# Patient Record
Sex: FEMALE | Race: OTHER | Employment: OTHER | URBAN - METROPOLITAN AREA
[De-identification: names, ages, dates, MRNs, and addresses within clinical notes are randomized per-mention and may not be internally consistent; named-entity substitution may affect disease eponyms.]

---

## 2019-09-13 ENCOUNTER — HOSPITAL ENCOUNTER (OUTPATIENT)
Dept: MAMMOGRAPHY | Age: 49
Discharge: HOME | End: 2019-09-13

## 2019-09-13 ENCOUNTER — OFFICE VISIT (OUTPATIENT)
Dept: FAMILY PLANNING/WOMEN'S HEALTH CLINIC | Age: 49
End: 2019-09-13

## 2019-09-13 VITALS — SYSTOLIC BLOOD PRESSURE: 129 MMHG | DIASTOLIC BLOOD PRESSURE: 85 MMHG

## 2019-09-13 DIAGNOSIS — Z01.419 ENCOUNTER FOR WELL WOMAN EXAM: Primary | ICD-10-CM

## 2019-09-13 DIAGNOSIS — Z12.31 VISIT FOR SCREENING MAMMOGRAM: ICD-10-CM

## 2019-09-13 DIAGNOSIS — N63.0 BREAST MASS IN FEMALE: ICD-10-CM

## 2019-09-13 NOTE — PROGRESS NOTES
Assessment/Plan:    Diagnoses and all orders for this visit:    1. Encounter for well woman exam    2. Breast mass in female    pedro breast mass, right > left. Right breast bx done on mass in 2014 but pt states that the mass has enlarged and changed  Will get dx imaging and ultrasound and f/up as needed         [address] [facility], [place]  Evelio Caldera expressed understanding of this plan. An AVS was printed and given to the patient.      ----------------------------------------------------------------------    Chief Complaint   Patient presents with    Well Woman     EWL visit       History of Present Illness:  45 yo, UTD on pap at local clinic, presents for breast exam  States that she has mass in right breast. Had bx of this mass 5 years ago but states that the mass has enlarged and changed  The bx report in her chart indicates that it was cystic mass  She stopped having periods 1 year ago      No past medical history on file. Current Outpatient Medications   Medication Sig Dispense Refill    metformin HCl (METFORMIN PO) Take  by mouth. No Known Allergies    Social History     Tobacco Use    Smoking status: Never Smoker    Smokeless tobacco: Never Used   Substance Use Topics    Alcohol use: Not on file    Drug use: Not on file       No family history on file. Physical Exam:     Visit Vitals  /85   LMP 09/01/2018 (Approximate)       A&Ox3  WDWN NAD  Respirations normal and non labored  Breast exam- right breast she has mass that takes up much of her upper/outer quadrant and some of her medial/upper quadrant measuring 5 cm by 3 cm and irregular, mobile and not tender. No retractions  Left breast- she has a smaller mass with similar characteristics upper outer, mobile, measuring 3 cm by 2 cm.  No retractions

## 2019-09-13 NOTE — PROGRESS NOTES
EVERY WOMANS LIFE HISTORY QUESTIONNAIRE       No Yes Comments   Has a doctor ever seen or felt anything wrong with your breast? []                                  [x]                                  Right breast, in 2014   Have you ever had a breast biopsy? [x]                                  []                                  But had cyst aspirated 2014 at [facility]        When and where was last mammogram performed? 4/2014     Have you ever been told that there was a problem on your mammogram?   No Yes Comments   []                                  [x]                                  cyst     Do you have breast implants? No Yes Comments   [x]                                  []                                       When was your last Pap test performed? 7/30/2019 at [facility]  Have you ever had an abnormal Pap test?   No Yes Comments   [x]                                  []                                       Have you had a hysterectomy? No Yes Comments (why)   [x]                                  []                                       Have you been through menopause? No Yes Date of LMP   []                                  [x]                                  1 yr ago     Did your mother take YVONNE? No Yes Unknown   [x]                                  []                                       Do you have a history of HIV exposure? No Yes    [x]                                  []                                       Have you ever been diagnosed with any type of Cancer   No Yes Comments (type,when,where,type of treatment   [x]                                  []                                          Has a family member been diagnosed with breast or ovarian cancer?    No Yes Comments (which family members, and type   [x]                                  []                                       Are you taking hormone replacement therapy (HRT)     No Yes Comments   [x] []                                       How many times have you been pregnant?     0      Number of live births ? Are you experiencing any of the following? No Yes Comments   Nipple Discharge [x]                                  []                                     Breast Lump/Masses [x]                                  []                                     Breast Skin Changes [x]                                  []                                          No Yes Comments   Vaginal Discharge [x]                                  []                                     Abnormal/unusual vaginal bleeding [x]                                  []                                         Are you experiencing any other health problems? Diabetes, Goes to [facility].    Age at first period?   13  Age at first birth? n/a    Ht--1.5 m  Wt-- 80 #

## 2019-09-24 ENCOUNTER — HOSPITAL ENCOUNTER (OUTPATIENT)
Dept: MAMMOGRAPHY | Age: 49
Discharge: HOME | End: 2019-09-24

## 2019-09-24 DIAGNOSIS — R92.8 ABNORMAL MAMMOGRAM: ICD-10-CM

## 2019-09-24 PROCEDURE — 76642 ULTRASOUND BREAST LIMITED: CPT

## 2019-09-24 PROCEDURE — 77062 BREAST TOMOSYNTHESIS BI: CPT

## 2019-09-25 ENCOUNTER — TELEPHONE (OUTPATIENT)
Dept: FAMILY PLANNING/WOMEN'S HEALTH CLINIC | Age: 49
End: 2019-09-25

## 2020-03-05 DIAGNOSIS — R92.8 ABNORMAL FINDINGS ON DIAGNOSTIC IMAGING OF BREAST: Primary | ICD-10-CM

## 2020-03-20 ENCOUNTER — HOSPITAL ENCOUNTER (OUTPATIENT)
Dept: MAMMOGRAPHY | Age: 50
Discharge: HOME | End: 2020-03-20

## 2020-03-20 DIAGNOSIS — R92.8 ABNORMAL FINDINGS ON DIAGNOSTIC IMAGING OF BREAST: ICD-10-CM

## 2020-03-20 PROCEDURE — 76642 ULTRASOUND BREAST LIMITED: CPT

## 2020-08-20 ENCOUNTER — HOSPITAL ENCOUNTER (EMERGENCY)
Age: 50
Discharge: HOME | End: 2020-08-20
Attending: EMERGENCY MEDICINE | Admitting: EMERGENCY MEDICINE
Payer: SUBSIDIZED

## 2020-08-20 ENCOUNTER — APPOINTMENT (OUTPATIENT)
Dept: GENERAL RADIOLOGY | Age: 50
End: 2020-08-20
Attending: EMERGENCY MEDICINE
Payer: SUBSIDIZED

## 2020-08-20 VITALS
DIASTOLIC BLOOD PRESSURE: 81 MMHG | RESPIRATION RATE: 16 BRPM | HEART RATE: 70 BPM | SYSTOLIC BLOOD PRESSURE: 154 MMHG | OXYGEN SATURATION: 97 % | TEMPERATURE: 98.3 F

## 2020-08-20 DIAGNOSIS — S43.401A SPRAIN OF RIGHT SHOULDER, UNSPECIFIED SHOULDER SPRAIN TYPE, INITIAL ENCOUNTER: Primary | ICD-10-CM

## 2020-08-20 PROCEDURE — 74011250637 HC RX REV CODE- 250/637: Performed by: EMERGENCY MEDICINE

## 2020-08-20 PROCEDURE — 73080 X-RAY EXAM OF ELBOW: CPT

## 2020-08-20 PROCEDURE — 72050 X-RAY EXAM NECK SPINE 4/5VWS: CPT

## 2020-08-20 PROCEDURE — 73030 X-RAY EXAM OF SHOULDER: CPT

## 2020-08-20 PROCEDURE — 99283 EMERGENCY DEPT VISIT LOW MDM: CPT

## 2020-08-20 RX ORDER — IBUPROFEN 600 MG/1
600 TABLET ORAL
Status: COMPLETED | OUTPATIENT
Start: 2020-08-20 | End: 2020-08-20

## 2020-08-20 RX ORDER — HYDROCODONE BITARTRATE AND ACETAMINOPHEN 5; 325 MG/1; MG/1
1 TABLET ORAL
Status: COMPLETED | OUTPATIENT
Start: 2020-08-20 | End: 2020-08-20

## 2020-08-20 RX ORDER — HYDROCODONE BITARTRATE AND ACETAMINOPHEN 5; 325 MG/1; MG/1
1 TABLET ORAL
Qty: 8 TAB | Refills: 0 | Status: SHIPPED | OUTPATIENT
Start: 2020-08-20 | End: 2020-08-22

## 2020-08-20 RX ADMIN — HYDROCODONE BITARTRATE AND ACETAMINOPHEN 1 TABLET: 5; 325 TABLET ORAL at 18:57

## 2020-08-20 RX ADMIN — IBUPROFEN 600 MG: 600 TABLET, FILM COATED ORAL at 18:57

## 2020-08-20 NOTE — ED TRIAGE NOTES
She slipped on the stairs earlier and caught herself with her right arm. she now lhas pain right upper arm and shoulder.

## 2020-08-20 NOTE — ED PROVIDER NOTES
57-year-old female presents emergency room for evaluation of right shoulder and arm pain. History obtained via  interpretation by the patient's friend per request of the patient. This afternoon at 2:00 patient was walking down the steps when she slipped. Patient fell down 5 steps onto her right arm. Patient states her arm was extended. Patient with pain in the right shoulder and the right elbow. No numbness or tingling or loss of sensation. Patient tried over-the-counter medicine and pain rub with no relief. Pain is described as sharp. Pain is worse with any movement. Patient denies any injury to the head. No loss of consciousness. No abdominal pain, nausea or vomiting. No numbness or tingling of the extremity. No neck or back pain. No chest pain. No hip or pelvic pain. No lower extremity pain. No alleviating factors. Patient denies any dizziness lightheadedness headache chest pain shortness of breath prior to the fall. This was a pure slip mechanical fall    Social hx  Nonsmoker  No alcohol    The history is provided by the patient. Arm Pain    Pertinent negatives include no numbness and no neck pain.         Past Medical History:   Diagnosis Date    Diabetes [facility])        Past Surgical History:   Procedure Laterality Date    HX BREAST BIOPSY Left     13yo- benign per patient    US GUIDE ASP BREAST RT CYST W NDL Right 2014    benign         Family History:   Problem Relation Age of Onset    Breast Cancer Sister 27    Breast Cancer Maternal Aunt        Social History     Socioeconomic History    Marital status: LEGALLY      Spouse name: Not on file    Number of children: Not on file    Years of education: Not on file    Highest education level: Not on file   Occupational History    Not on file   Social Needs    Financial resource strain: Not on file    Food insecurity     Worry: Not on file     Inability: Not on file    Transportation needs     Medical: Not on file Non-medical: Not on file   Tobacco Use    Smoking status: Never Smoker    Smokeless tobacco: Never Used   Substance and Sexual Activity    Alcohol use: Not on file    Drug use: Not on file    Sexual activity: Not on file   Lifestyle    Physical activity     Days per week: Not on file     Minutes per session: Not on file    Stress: Not on file   Relationships    Social connections     Talks on phone: Not on file     Gets together: Not on file     Attends Mu-ism service: Not on file     Active member of club or organization: Not on file     Attends meetings of clubs or organizations: Not on file     Relationship status: Not on file    Intimate partner violence     Fear of current or ex partner: Not on file     Emotionally abused: Not on file     Physically abused: Not on file     Forced sexual activity: Not on file   Other Topics Concern    Not on file   Social History Narrative    Not on file         ALLERGIES: Patient has no known allergies. Review of Systems   Constitutional: Negative for chills and fatigue. HENT: Negative for trouble swallowing. Eyes: Negative for photophobia and visual disturbance. Respiratory: Negative for cough, chest tightness and shortness of breath. Cardiovascular: Negative for chest pain. Gastrointestinal: Negative for abdominal pain, nausea and vomiting. Musculoskeletal: Positive for arthralgias (right shoulder, elbow). Negative for joint swelling, myalgias, neck pain and neck stiffness. Skin: Negative for color change and rash. Neurological: Negative for dizziness, weakness, light-headedness, numbness and headaches. All other systems reviewed and are negative. Vitals:    08/20/20 1805   BP: 154/81   Pulse: 70   Resp: 16   Temp: 98.3 °F (36.8 °C)   SpO2: 97%            Physical Exam  Vitals signs and nursing note reviewed. Constitutional:       General: She is not in acute distress. Appearance: Normal appearance. She is well-developed. HENT:      Head: Normocephalic and atraumatic. Right Ear: External ear normal.      Left Ear: External ear normal.      Nose: Nose normal.   Eyes:      General:         Right eye: No discharge. Left eye: No discharge. Conjunctiva/sclera: Conjunctivae normal.      Pupils: Pupils are equal, round, and reactive to light. Neck:      Musculoskeletal: Normal range of motion and neck supple. Comments: No cervical midline tenderness to palpation of Cspine. Cardiovascular:      Rate and Rhythm: Normal rate and regular rhythm. Heart sounds: Normal heart sounds. Pulmonary:      Effort: Pulmonary effort is normal. No respiratory distress. Breath sounds: Normal breath sounds. Chest:      Chest wall: No tenderness. Abdominal:      General: Bowel sounds are normal. There is no distension. Palpations: Abdomen is soft. Abdomen is not rigid. There is no hepatomegaly, splenomegaly or mass. Tenderness: There is no abdominal tenderness. There is no right CVA tenderness, left CVA tenderness, guarding or rebound. Negative signs include Carrillo's sign and McBurney's sign. Hernia: No hernia is present. Musculoskeletal: Normal range of motion. General: Tenderness present. Comments: Right Shoulder: no redness, warmth, swelling, no edema, no ecchymosis, no obvious deformity. ot tenderness along glenohumeral joint. range of motion limited by pain. Tender along the elbow diffusely. FROM of elbow. Pt able to fully flex and extend. Axillary nerve intact, radial,media,ulnar nerve intact. 5/5  strength, 5/5 ab/addcution of fingers. Cap refill brisk < 3 seconds. Pt neurovascularly intact. Skin:     General: Skin is warm and dry. Findings: No erythema or rash. Neurological:      Mental Status: She is alert and oriented to person, place, and time. Cranial Nerves: No cranial nerve deficit. Motor: No abnormal muscle tone.       Coordination: Coordination normal.      Deep Tendon Reflexes: Reflexes are normal and symmetric. Reflexes normal.   Psychiatric:         Behavior: Behavior normal.         Thought Content: Thought content normal.         Judgment: Judgment normal.          MDM  Number of Diagnoses or Management Options  Diagnosis management comments: 53-year-old female presenting to the emergency room for evaluation right shoulder pain status post fall. no neurological deficits on exam.  No midline tenderness to cervical thoracic or lumbar spine. Tender along the glenohumeral joint and the elbow. Neurovascularly intact  Plan: X-ray and pain medicine      7:46 PM  Xray negative for acute process. Patient is well hydrated, well appearing, and in no respiratory distress. Physical exam is reassuring, and without signs of serious illness. Will discharge patient home with sling, supportive care, and follow-up with orthopedics within the next few days. With Upper sorbian interpretation: Standard narcotic and sedating medication warnings given. Patient's results have been reviewed with them. Patient and/or family have verbally conveyed their understanding and agreement of the patient's signs, symptoms, diagnosis, treatment and prognosis and additionally agree to follow up as recommended or return to the Emergency Room should their condition change prior to follow-up. Discharge instructions have also been provided to the patient with some educational information regarding their diagnosis as well a list of reasons why they would want to return to the ER prior to their follow-up appointment should their condition change. Amount and/or Complexity of Data Reviewed  Discuss the patient with other providers: yes (ER attending-Dorothy)           Procedures    Pt case including HPI, PE, and all available lab and radiology results has been discussed with attending physician. Opportunity to evaluate patient has been provided to ER attending.   Discharge and prescription plan has been agreed upon.

## 2020-08-20 NOTE — DISCHARGE INSTRUCTIONS
Advil or aleve for pain. Norco:1 pill every 6 hours as needed for severe pain. Be aware of sedating effects, no alcohol or driving with this medicine. Keep shoulder elevated for next 48 hours. Place ice in a bag and apply to shoulder  for 20 minutes 4-5 times a day for next 48 hours. Return to ER for any redness, warmth, increased swelling  fever over 101. Advil o aleve para el dolor. Norco: 1 pastilla cada 6 horas según sea necesario para el dolor intenso. Tenga en cuenta los efectos sedantes, no beber alcohol o conducir con [employer]. [place] próximas 48 horas. Coloque hielo en nicky bolsa y aplíquelo en el hombro mita 20 minutos 4-5 veces al día mita las próximas 48 horas. Regrese a la tatum de emergencias si tiene enrojecimiento, calor o aumento de la fiebre por encima de 101. Dolor en el hombro: Instrucciones de cuidado  Shoulder Pain: Care Instructions  Instrucciones de cuidado    Puede lesionar osborne hombro al usarlo demasiado mita [place], rob pescar o jugar béisbol. Puede suceder rob parte del desgaste cotidiano por el envejecimiento. Las lesiones de hombro pueden tardar tiempo en sanar, anisha osborne hombro debería mejorar con [employer]. Osborne médico podría recomendar un cabestrillo para descansar el hombro. Si se lesionó el hombro, adrianne vez necesite pruebas y [place]. La atención de seguimiento es nicky parte clave de osborne tratamiento y seguridad. Asegúrese de hacer y acudir a todas las citas, y llame a osborne médico si está teniendo problemas. También es nicky buena idea saber los resultados de shanon exámenes y mantener nicky lista de los medicamentos que keily. ¿Cómo puede cuidarse en el hogar? · [employer] analgésicos (medicamentos para el dolor) exactamente según las indicaciones. ? Si el médico le recetó analgésicos, tómelos según las indicaciones. ? Si no está tomando un analgésico recetado, pregúntele a osborne médico si puede milan roberto de [address] ?  No tome dos o más analgésicos al [employer], a menos que el médico se lo haya indicado. Muchos analgésicos contienen acetaminofén, es decir, Tylenol. El exceso de acetaminofén (Tylenol) puede ser dañino. · Si osborne médico le recomienda usar un cabestrillo, úselo rob se le haya indicado. No se lo quite antes de que se lo indique el médico.  · Aplíquese hielo o nicky compresa fría sobre la laura adolorida mita 10 a 20 minutos cada vez. Póngase un paño cross entre el hielo y la piel. · Si no hay hinchazón, puede aplicar calor húmedo, nicky almohadilla térmica o un paño tibio sobre el hombro. Algunos médicos sugieren alternar [employer] [place] y aftab. · Descanse el hombro mita algunos días. Si osborne médico lo recomienda, puede comenzar a ejercitar el hombro con suavidad, anisha no levante nada pesado. ¿Cuándo debe pedir ayuda? Llame [id number] en cualquier momento que considere que necesita atención de [place]. Por ejemplo, llame si:  · Siente dolor u opresión en el pecho. [place] podría ocurrir junto con:  ? Sudoración. ? Falta de aire. ? Náuseas o vómito. ? Dolor que se extiende del pecho al darling, la Laura, o hacia roberto o ambos hombros o [id number] ? [employer]. ? Pulso rápido o irregular. Después de llamar al 911, mastique 1 aspirina para adultos. Espere a la ambulancia. No trate de conducir usted mismo un automóvil. · Osborne brazo o mano está frío o pálido, o cambia de color. Llame a osborne médico ahora mismo o busque atención médica inmediata si:  · Tiene señales de infección, tales rob:  ? Mayor dolor, hinchazón, enrojecimiento o aumento de la temperatura en el hombro. ? Vetas rojizas que comienzan en nicky laura del hombro. ? Pus que supura de nicky laura del hombro. ? Ganglios linfáticos inflamados en el darling, las axilas o la jessica. ? Yesica Castro. Preste especial atención a los cambios en osborne carmelo y asegúrese de comunicarse con osborne médico si:  · No puede usar el hombro.   · El hombro no mejora rob se esperaba. ¿Dónde puede encontrar más información en inglés? Yareli Tan a [email or web address]  Cathi Shields [id number] en la búsqueda para aprender más acerca de \"Dolor en el hombro: Instrucciones de cuidado. \"  Revisado: 2 marzo, 2020               Versión del contenido: 12.5  © 0921-4250 Healthwise, [employer] (formerly [employer]). Las instrucciones de cuidado fueron adaptadas bajo licencia por [facility] (which disclaims liability or warranty for this information). Si usted tiene [place] afección médica o sobre estas instrucciones, siempre pregunte a osborne profesional de carmelo. [employer], [employer] niega toda garantía o responsabilidad por osborne uso de esta información.

## 2022-07-26 ENCOUNTER — HOSPITAL ENCOUNTER (EMERGENCY)
Age: 52
Discharge: HOME | End: 2022-07-26
Attending: EMERGENCY MEDICINE

## 2022-07-26 ENCOUNTER — APPOINTMENT (OUTPATIENT)
Dept: CT IMAGING | Age: 52
End: 2022-07-26
Attending: NURSE PRACTITIONER

## 2022-07-26 VITALS
DIASTOLIC BLOOD PRESSURE: 83 MMHG | TEMPERATURE: 97.6 F | RESPIRATION RATE: 20 BRPM | OXYGEN SATURATION: 99 % | SYSTOLIC BLOOD PRESSURE: 155 MMHG | HEART RATE: 68 BPM

## 2022-07-26 DIAGNOSIS — R11.2 NAUSEA AND VOMITING, UNSPECIFIED VOMITING TYPE: ICD-10-CM

## 2022-07-26 DIAGNOSIS — R10.84 ABDOMINAL PAIN, GENERALIZED: Primary | ICD-10-CM

## 2022-07-26 LAB
ALBUMIN SERPL-MCNC: 4.2 G/DL (ref 3.5–5)
ALBUMIN/GLOB SERPL: 1.2 {RATIO} (ref 1.1–2.2)
ALP SERPL-CCNC: 105 U/L (ref 45–117)
ALT SERPL-CCNC: 48 U/L (ref 12–78)
ANION GAP SERPL CALC-SCNC: 9 MMOL/L (ref 5–15)
AST SERPL-CCNC: 22 U/L (ref 15–37)
BASOPHILS # BLD: 0 K/UL (ref 0–0.1)
BASOPHILS NFR BLD: 0 % (ref 0–1)
BILIRUB SERPL-MCNC: 0.5 MG/DL (ref 0.2–1)
BUN SERPL-MCNC: 16 MG/DL (ref 6–20)
BUN/CREAT SERPL: 23 (ref 12–20)
CALCIUM SERPL-MCNC: 9.7 MG/DL (ref 8.5–10.1)
CHLORIDE SERPL-SCNC: 101 MMOL/L (ref 97–108)
CO2 SERPL-SCNC: 28 MMOL/L (ref 21–32)
COMMENT, HOLDF: NORMAL
CREAT SERPL-MCNC: 0.69 MG/DL (ref 0.55–1.02)
DIFFERENTIAL METHOD BLD: NORMAL
EOSINOPHIL # BLD: 0.1 K/UL (ref 0–0.4)
EOSINOPHIL NFR BLD: 1 % (ref 0–7)
ERYTHROCYTE [DISTWIDTH] IN BLOOD BY AUTOMATED COUNT: 11.9 % (ref 11.5–14.5)
GLOBULIN SER CALC-MCNC: 3.4 G/DL (ref 2–4)
GLUCOSE SERPL-MCNC: 144 MG/DL (ref 65–100)
HCT VFR BLD AUTO: 42.3 % (ref 35–47)
HGB BLD-MCNC: 14.5 G/DL (ref 11.5–16)
IMM GRANULOCYTES # BLD AUTO: 0 K/UL (ref 0–0.04)
IMM GRANULOCYTES NFR BLD AUTO: 0 % (ref 0–0.5)
LIPASE SERPL-CCNC: 161 U/L (ref 73–393)
LYMPHOCYTES # BLD: 2.2 K/UL (ref 0.8–3.5)
LYMPHOCYTES NFR BLD: 23 % (ref 12–49)
MAGNESIUM SERPL-MCNC: 2.2 MG/DL (ref 1.6–2.4)
MCH RBC QN AUTO: 29.1 PG (ref 26–34)
MCHC RBC AUTO-ENTMCNC: 34.3 G/DL (ref 30–36.5)
MCV RBC AUTO: 84.9 FL (ref 80–99)
MONOCYTES # BLD: 0.6 K/UL (ref 0–1)
MONOCYTES NFR BLD: 6 % (ref 5–13)
NEUTS SEG # BLD: 6.6 K/UL (ref 1.8–8)
NEUTS SEG NFR BLD: 70 % (ref 32–75)
NRBC # BLD: 0 K/UL (ref 0–0.01)
NRBC BLD-RTO: 0 PER 100 WBC
PLATELET # BLD AUTO: 276 K/UL (ref 150–400)
PMV BLD AUTO: 10.1 FL (ref 8.9–12.9)
POTASSIUM SERPL-SCNC: 3.7 MMOL/L (ref 3.5–5.1)
PROT SERPL-MCNC: 7.6 G/DL (ref 6.4–8.2)
RBC # BLD AUTO: 4.98 M/UL (ref 3.8–5.2)
SAMPLES BEING HELD,HOLD: NORMAL
SODIUM SERPL-SCNC: 138 MMOL/L (ref 136–145)
WBC # BLD AUTO: 9.5 K/UL (ref 3.6–11)

## 2022-07-26 PROCEDURE — 83690 ASSAY OF LIPASE: CPT

## 2022-07-26 PROCEDURE — 74011250636 HC RX REV CODE- 250/636: Performed by: NURSE PRACTITIONER

## 2022-07-26 PROCEDURE — 85025 COMPLETE CBC W/AUTO DIFF WBC: CPT

## 2022-07-26 PROCEDURE — 74011000636 HC RX REV CODE- 636: Performed by: RADIOLOGY

## 2022-07-26 PROCEDURE — 83735 ASSAY OF MAGNESIUM: CPT

## 2022-07-26 PROCEDURE — 99285 EMERGENCY DEPT VISIT HI MDM: CPT

## 2022-07-26 PROCEDURE — 74177 CT ABD & PELVIS W/CONTRAST: CPT

## 2022-07-26 PROCEDURE — 80053 COMPREHEN METABOLIC PANEL: CPT

## 2022-07-26 PROCEDURE — 36415 COLL VENOUS BLD VENIPUNCTURE: CPT

## 2022-07-26 PROCEDURE — 96374 THER/PROPH/DIAG INJ IV PUSH: CPT

## 2022-07-26 PROCEDURE — 96372 THER/PROPH/DIAG INJ SC/IM: CPT

## 2022-07-26 PROCEDURE — 96375 TX/PRO/DX INJ NEW DRUG ADDON: CPT

## 2022-07-26 RX ORDER — KETOROLAC TROMETHAMINE 30 MG/ML
15 INJECTION, SOLUTION INTRAMUSCULAR; INTRAVENOUS
Status: COMPLETED | OUTPATIENT
Start: 2022-07-26 | End: 2022-07-26

## 2022-07-26 RX ORDER — ONDANSETRON 4 MG/1
4 TABLET, FILM COATED ORAL
Qty: 20 TABLET | Refills: 0 | Status: SHIPPED | OUTPATIENT
Start: 2022-07-26

## 2022-07-26 RX ORDER — ONDANSETRON 2 MG/ML
4 INJECTION INTRAMUSCULAR; INTRAVENOUS
Status: COMPLETED | OUTPATIENT
Start: 2022-07-26 | End: 2022-07-26

## 2022-07-26 RX ORDER — DICYCLOMINE HYDROCHLORIDE 10 MG/1
10 CAPSULE ORAL 3 TIMES DAILY
Qty: 20 CAPSULE | Refills: 0 | Status: SHIPPED | OUTPATIENT
Start: 2022-07-26 | End: 2022-08-02

## 2022-07-26 RX ORDER — DICYCLOMINE HYDROCHLORIDE 10 MG/ML
20 INJECTION INTRAMUSCULAR
Status: COMPLETED | OUTPATIENT
Start: 2022-07-26 | End: 2022-07-26

## 2022-07-26 RX ORDER — SODIUM CHLORIDE 9 MG/ML
1000 INJECTION, SOLUTION INTRAVENOUS ONCE
Status: COMPLETED | OUTPATIENT
Start: 2022-07-26 | End: 2022-07-26

## 2022-07-26 RX ADMIN — KETOROLAC TROMETHAMINE 15 MG: 30 INJECTION, SOLUTION INTRAMUSCULAR at 20:56

## 2022-07-26 RX ADMIN — SODIUM CHLORIDE 1000 ML: 9 INJECTION, SOLUTION INTRAVENOUS at 20:57

## 2022-07-26 RX ADMIN — DICYCLOMINE HYDROCHLORIDE 20 MG: 20 INJECTION INTRAMUSCULAR at 22:19

## 2022-07-26 RX ADMIN — ONDANSETRON HYDROCHLORIDE 4 MG: 2 SOLUTION INTRAMUSCULAR; INTRAVENOUS at 20:56

## 2022-07-26 RX ADMIN — IOPAMIDOL 100 ML: 755 INJECTION, SOLUTION INTRAVENOUS at 21:27

## 2022-07-27 NOTE — DISCHARGE INSTRUCTIONS
Limit yourself to a clear diet until your pain has resolved. The pain very well may get worse before it gets better, use the Bentyl to help with the pain and cramping, the Zofran for the nausea and vomiting. If the pain continues please follow-up with your primary care or GI specialist.  If symptoms worsen return to the ER immediately.

## 2022-07-27 NOTE — ED TRIAGE NOTES
Patient presents ambulatory to triage with CC of abdominal pain since Thursday after having patricia seeds in a drink. Endorses N/V, no diarrhea. Patient indicates pain is diffuse, no focused area.

## 2022-07-27 NOTE — ED PROVIDER NOTES
49-year-old female presents ambulatory with medical history of diabetes with complaints of increased upper abdominal pain since eating Srini Seeds this past Thursday. Patient states that since she ate the [place] seeds she feels like they get stuck in her stomach, associated symptoms of nausea and vomiting, denies diarrhea. Eating makes the pain worse. Patient states that she has been forcing herself to vomit to make it feel better. Patient denies fever, sore throat, cough, chest pain, shortness of breath, denies frequency, burning, pelvic pain and diarrhea. Past Medical History:   Diagnosis Date    Diabetes [facility])        Past Surgical History:   Procedure Laterality Date    HX BREAST BIOPSY Left     13yo- benign per patient    US GUIDE ASP BREAST RT CYST W NDL Right 2014    benign         Family History:   Problem Relation Age of Onset    Breast Cancer Sister 27    Breast Cancer Maternal Aunt        Social History     Socioeconomic History    Marital status: SINGLE     Spouse name: Not on file    Number of children: Not on file    Years of education: Not on file    Highest education level: Not on file   Occupational History    Not on file   Tobacco Use    Smoking status: Never    Smokeless tobacco: Never   Substance and Sexual Activity    Alcohol use: Not on file    Drug use: Not on file    Sexual activity: Not on file   Other Topics Concern    Not on file   Social History Narrative    Not on file     Social Determinants of Health     Financial Resource Strain: Not on file   Food Insecurity: Not on file   Transportation Needs: Not on file   Physical Activity: Not on file   Stress: Not on file   Social Connections: Not on file   Intimate Partner Violence: Not on file   Housing Stability: Not on file         ALLERGIES: Patient has no known allergies. Review of Systems   Constitutional:  Positive for appetite change and chills. Negative for fever. HENT: Negative.      Respiratory:  Negative for cough and shortness of breath. Cardiovascular:  Negative for chest pain. Gastrointestinal:  Positive for abdominal pain, nausea and vomiting. Negative for blood in stool and diarrhea. Genitourinary:  Negative for difficulty urinating, dysuria and frequency. Musculoskeletal: Negative. Skin:  Negative for rash and wound. Neurological:  Negative for dizziness, weakness, light-headedness and headaches. Psychiatric/Behavioral: Negative. Vitals:    07/26/22 2032   BP: (!) 155/83   Pulse: 68   Resp: 20   Temp: 97.6 °F (36.4 °C)   SpO2: 99%            Physical Exam  Vitals and nursing note reviewed. Constitutional:       General: She is in acute distress. Appearance: Normal appearance. She is well-developed and normal weight. Comments: Patient tearful and moaning during assessment, rubbing her abdomen. HENT:      Head: Normocephalic. Nose: Nose normal.      Mouth/Throat:      Mouth: Mucous membranes are moist.   Cardiovascular:      Rate and Rhythm: Normal rate and regular rhythm. Heart sounds: Normal heart sounds. Pulmonary:      Effort: Pulmonary effort is normal. No respiratory distress. Abdominal:      General: Abdomen is protuberant. Bowel sounds are decreased. Palpations: Abdomen is soft. Tenderness: There is abdominal tenderness in the epigastric area, periumbilical area, left upper quadrant and left lower quadrant. There is no right CVA tenderness, left CVA tenderness, guarding or rebound. Musculoskeletal:         General: Normal range of motion. Cervical back: Normal range of motion. Skin:     General: Skin is warm and dry. Capillary Refill: Capillary refill takes less than 2 seconds. Neurological:      Mental Status: She is alert and oriented to person, place, and time. Psychiatric:         Mood and Affect: Mood is anxious.         MDM  Number of Diagnoses or Management Options  Diagnosis management comments: Differential DX: UTI vs. SBO vs. Pyelonepritis vs pancreatitis       Amount and/or Complexity of Data Reviewed  Clinical lab tests: ordered  Tests in the radiology section of CPT®: ordered           Procedures    VITAL SIGNS:  Patient Vitals for the past 4 hrs:   Temp Pulse Resp BP SpO2   07/26/22 2032 97.6 °F (36.4 °C) 68 20 (!) 155/83 99 %         LABS:  Recent Results (from the past 6 hour(s))   CBC WITH AUTOMATED DIFF    Collection Time: 07/26/22  8:42 PM   Result Value Ref Range    WBC 9.5 3.6 - 11.0 K/uL    RBC 4.98 3.80 - 5.20 M/uL    HGB 14.5 11.5 - 16.0 g/dL    HCT 42.3 35.0 - 47.0 %    MCV 84.9 80.0 - 99.0 FL    MCH 29.1 26.0 - 34.0 PG    MCHC 34.3 30.0 - 36.5 g/dL    RDW 11.9 11.5 - 14.5 %    PLATELET 450 924 - 613 K/uL    MPV 10.1 8.9 - 12.9 FL    NRBC 0.0 0  WBC    ABSOLUTE NRBC 0.00 0.00 - 0.01 K/uL    NEUTROPHILS 70 32 - 75 %    LYMPHOCYTES 23 12 - 49 %    MONOCYTES 6 5 - 13 %    EOSINOPHILS 1 0 - 7 %    BASOPHILS 0 0 - 1 %    IMMATURE GRANULOCYTES 0 0.0 - 0.5 %    ABS. NEUTROPHILS 6.6 1.8 - 8.0 K/UL    ABS. LYMPHOCYTES 2.2 0.8 - 3.5 K/UL    ABS. MONOCYTES 0.6 0.0 - 1.0 K/UL    ABS. EOSINOPHILS 0.1 0.0 - 0.4 K/UL    ABS. BASOPHILS 0.0 0.0 - 0.1 K/UL    ABS. IMM. GRANS. 0.0 0.00 - 0.04 K/UL    DF AUTOMATED     METABOLIC PANEL, COMPREHENSIVE    Collection Time: 07/26/22  8:42 PM   Result Value Ref Range    Sodium 138 136 - 145 mmol/L    Potassium 3.7 3.5 - 5.1 mmol/L    Chloride 101 97 - 108 mmol/L    CO2 28 21 - 32 mmol/L    Anion gap 9 5 - 15 mmol/L    Glucose 144 (H) 65 - 100 mg/dL    BUN 16 6 - 20 MG/DL    Creatinine 0.69 0.55 - 1.02 MG/DL    BUN/Creatinine ratio 23 (H) 12 - 20      GFR est AA >60 >60 ml/min/1.73m2    GFR est non-AA >60 >60 ml/min/1.73m2    Calcium 9.7 8.5 - 10.1 MG/DL    Bilirubin, total 0.5 0.2 - 1.0 MG/DL    ALT (SGPT) 48 12 - 78 U/L    AST (SGOT) 22 15 - 37 U/L    Alk.  phosphatase 105 45 - 117 U/L    Protein, total 7.6 6.4 - 8.2 g/dL    Albumin 4.2 3.5 - 5.0 g/dL    Globulin 3.4 2.0 - 4.0 g/dL    A-G Ratio 1.2 1.1 - 2.2     MAGNESIUM    Collection Time: 07/26/22  8:42 PM   Result Value Ref Range    Magnesium 2.2 1.6 - 2.4 mg/dL   LIPASE    Collection Time: 07/26/22  8:42 PM   Result Value Ref Range    Lipase 161 73 - 393 U/L   SAMPLES BEING HELD    Collection Time: 07/26/22  8:42 PM   Result Value Ref Range    SAMPLES BEING HELD 1 RED     COMMENT        Add-on orders for these samples will be processed based on acceptable specimen integrity and analyte stability, which may vary by analyte. IMAGING:  CT ABD PELV W CONT   Final Result   Imaging findings consistent with a minimal/mild gastro-enteritis. Consider   infectious and inflammatory etiologies. Please see above for additional nonemergent incidental findings. Medications During Visit:  Medications   dicyclomine (BENTYL) 10 mg/mL injection 20 mg (has no administration in time range)   0.9% sodium chloride infusion 1,000 mL (1,000 mL IntraVENous New Bag 7/26/22 2057)   ketorolac (TORADOL) injection 15 mg (15 mg IntraVENous Given 7/26/22 2056)   ondansetron (ZOFRAN) injection 4 mg (4 mg IntraVENous Given 7/26/22 2056)   iopamidoL (ISOVUE-370) 76 % injection 100 mL (100 mL IntraVENous Given 7/26/22 2127)         DECISION MAKING:  Steve Farrell is a 45 y.o. female who comes in as above. In a speaking 77-year-old female presents ambulatory with family, patient requesting that the family member assist with translation.  offered. Patient states that this past Thursday she had Srini seeds and a drink and ever since then felt feels like they got stuck in her stomach. Patient states that when she tries to eat or drink she forces herself to vomit and is nauseated. Patient denies fever, states that she feels chilled, denies chest pain, shortness of breath, cough, difficulty swallowing. History of diabetes, denies abdominal surgeries. Discussed plan with patient to check urinalysis, CT ABD, cbc. Cmp, lipase. 2150-reevaluated, pain improved, will p.o. challenge, labs reassuring for no widespread infection or electrolyte imbalance, no acute kidney injury. CT reviewed and showing a mild gastroenteritis. Will send home with follow-up for GI specialist and PCP, along with Rx for Bentyl to help manage pain and Zofran for the nausea vomiting. Patient remains vital signs stable at this time. Family member at the bedside to translate per patient's wishes. Patient given the opportunity to ask questions, and verbalized understanding. IMPRESSION:  1. Abdominal pain, generalized    2. Nausea and vomiting, unspecified vomiting type        DISPOSITION:  Discharged      Current Discharge Medication List        START taking these medications    Details   dicyclomine (BENTYL) 10 mg capsule Take 1 Capsule by mouth three (3) times daily for 20 doses. Qty: 20 Capsule, Refills: 0  Start date: 7/26/2022, End date: 8/2/2022      ondansetron hcl (Zofran) 4 mg tablet Take 1 Tablet by mouth every eight (8) hours as needed for Nausea or Vomiting. Qty: 20 Tablet, Refills: 0  Start date: 7/26/2022              Follow-up Information       Follow up With Specialties Details Why Contact Info    Frankey Amass, PA-C Physician Assistant Schedule an appointment as soon as possible for a visit in 1 week  [address]  [address] [facility] [address] [facility] [address] [facility] Emergency Medicine  If symptoms worsen [address] [facility]  [phone]    Shante Gilbert MD Gastroenterology Schedule an appointment as soon as possible for a visit  As needed- GI Specialist [place]   [phone]                The patient is asked to follow-up with their primary care provider in the next several days. They are to call tomorrow for an appointment. The patient is asked to return promptly for any increased concerns or worsening of symptoms.   They can return to this emergency department or any other emergency department.     Carmen Hart NP  10:01 PM

## 2022-09-29 ENCOUNTER — TELEPHONE (OUTPATIENT)
Dept: FAMILY PLANNING/WOMEN'S HEALTH CLINIC | Age: 52
End: 2022-09-29

## 2022-09-29 NOTE — TELEPHONE ENCOUNTER
Called patient to reschedule her upcoming appointment due to her needing a 3D mammogram. Patient has been reschedule and appointment information has been given.

## 2022-12-16 ENCOUNTER — TRANSCRIBE ORDER (OUTPATIENT)
Dept: SCHEDULING | Age: 52
End: 2022-12-16

## 2022-12-16 DIAGNOSIS — Z12.31 VISIT FOR SCREENING MAMMOGRAM: Primary | ICD-10-CM

## 2023-01-04 ENCOUNTER — TELEPHONE (OUTPATIENT)
Dept: FAMILY PLANNING/WOMEN'S HEALTH CLINIC | Age: 53
End: 2023-01-04

## 2023-01-05 ENCOUNTER — OFFICE VISIT (OUTPATIENT)
Dept: FAMILY PLANNING/WOMEN'S HEALTH CLINIC | Age: 53
End: 2023-01-05

## 2023-01-05 ENCOUNTER — HOSPITAL ENCOUNTER (OUTPATIENT)
Dept: MAMMOGRAPHY | Age: 53
Discharge: HOME | End: 2023-01-05

## 2023-01-05 DIAGNOSIS — Z12.31 VISIT FOR SCREENING MAMMOGRAM: ICD-10-CM

## 2023-01-05 DIAGNOSIS — Z01.419 ENCOUNTER FOR WELL WOMAN EXAM: Primary | ICD-10-CM

## 2023-01-05 DIAGNOSIS — N63.0 BREAST MASS IN FEMALE: ICD-10-CM

## 2023-01-05 NOTE — PROGRESS NOTES
EVERY WOMANS LIFE HISTORY QUESTIONNAIRE       No Yes Comments   Has a doctor ever seen or felt anything wrong with your breast? [x]                                  []                                     Have you ever had a breast biopsy? []                                  [x]                                  2014 years at [facility], done to left breast found benign and possible 16 years ago left breast in [place], benign        When and where was last mammogram performed? 9/24/2019     Have you ever been told that there was a problem on your mammogram?   No Yes Comments   [x]                                  []                                       Do you have breast implants? No Yes Comments   [x]                                  []                                       When was your last Pap test performed? 2021 done at [address] [place] [address]. Pt declined [facility] pap. Have you ever had an abnormal Pap test?   No Yes Comments   [x]                                  []                                       Have you had a hysterectomy? No Yes Comments (why)   [x]                                  []                                       Have you been through menopause? No Yes Date of LMP   []                                  [x]                                  March 2019     Did your mother take YVONNE? No Yes Unknown   []                                  []                                  x     Do you have a history of HIV exposure? No Yes    [x]                                  []                                       Have you ever been diagnosed with any type of Cancer   No Yes Comments (type,when,where,type of treatment   [x]                                  []                                          Has a family member been diagnosed with breast or ovarian cancer?    No Yes Comments (which family members, and type   []                                  [x]                                  Sister with breast cancer at age 36     Are you taking hormone replacement therapy (HRT)     No Yes Comments   [x]                                  []                                       How many times have you been pregnant? 1   Number of live births ? 0    Are you experiencing any of the following? No Yes Comments   Nipple Discharge [x]                                  []                                     Breast Lump/Masses []                                  [x]                                  Right breast lump, no redness, no pain, some itching. Breast Skin Changes [x]                                  []                                          No Yes Comments   Vaginal Discharge [x]                                  []                                     Abnormal/unusual vaginal bleeding [x]                                  []                                         Are you experiencing any other health problems? Age at first period?10n/a  Age at first birth? Ht--1.5    Wt--120lbs    My  for this patients visit was Renu Cortes.

## 2023-01-05 NOTE — PROGRESS NOTES
Assessment/Plan:    Diagnoses and all orders for this visit:    1. Encounter for well woman exam    2. Breast mass in female  -     JOSE 3D BINA W MAMMO BI DX INCL CAD; Future  -     US BREAST RT LIMITED=<3 QUAD; Future            Raúl Borja PA-C  [address] expressed understanding of this plan. An AVS was printed and given to the patient.      ----------------------------------------------------------------------    Chief Complaint   Patient presents with    Well Woman       History of Present Illness:  44 yo here for annual well woman exam  She has a new problem with right breast mass. She has had pedro breast masses in the past. She has had bx to left breast (once in [place] and then in 2014 with results in chart)  The current right breast mass is described as tender at times  She has hx of extremely dense breast tissue  2019 exam by me and I ordered pedro dx imaging and was told she needed 6 month f/up Left breast only. She returned for 6 month f/up Left breast and then was to resume annual screening which she did not do until today       Past Medical History:   Diagnosis Date    Diabetes ([facility] [address]        Current Outpatient Medications   Medication Sig Dispense Refill    ondansetron hcl (Zofran) 4 mg tablet Take 1 Tablet by mouth every eight (8) hours as needed for Nausea or Vomiting. 20 Tablet 0    metformin HCl (METFORMIN PO) Take  by mouth. No Known Allergies    Social History     Tobacco Use    Smoking status: Never    Smokeless tobacco: Never       Family History   Problem Relation Age of Onset    Breast Cancer Sister 27    Breast Cancer Maternal Aunt        Physical Exam:     Visit Vitals  LMP 09/01/2018 (Approximate)       A&Ox3  WDWN NAD  Respirations normal and non labored    Breast exam- right breast she has a firm irregular mass infra nipple region and laterally at 9 oclock. Left breast she has several smooth, ovoid cystic feeling masses that are c/w her previously bx'd cysts. No dimpling or retractions found

## 2023-01-18 ENCOUNTER — HOSPITAL ENCOUNTER (OUTPATIENT)
Dept: MAMMOGRAPHY | Age: 53
Discharge: HOME | End: 2023-01-18

## 2023-01-18 DIAGNOSIS — N63.0 BREAST MASS IN FEMALE: ICD-10-CM

## 2023-01-18 PROCEDURE — 76642 ULTRASOUND BREAST LIMITED: CPT

## 2023-01-18 PROCEDURE — 77062 BREAST TOMOSYNTHESIS BI: CPT

## 2023-04-22 DIAGNOSIS — Z12.31 VISIT FOR SCREENING MAMMOGRAM: Primary | ICD-10-CM
